# Patient Record
Sex: FEMALE | URBAN - METROPOLITAN AREA
[De-identification: names, ages, dates, MRNs, and addresses within clinical notes are randomized per-mention and may not be internally consistent; named-entity substitution may affect disease eponyms.]

---

## 2018-08-21 ENCOUNTER — APPOINTMENT (RX ONLY)
Dept: URBAN - METROPOLITAN AREA CLINIC 23 | Facility: CLINIC | Age: 63
Setting detail: DERMATOLOGY
End: 2018-08-21

## 2018-08-21 DIAGNOSIS — Z41.9 ENCOUNTER FOR PROCEDURE FOR PURPOSES OTHER THAN REMEDYING HEALTH STATE, UNSPECIFIED: ICD-10-CM

## 2018-08-21 PROCEDURE — ? DIAGNOSIS COMMENT

## 2018-08-21 PROCEDURE — ? FILLERS

## 2018-08-21 PROCEDURE — ? DYSPORT

## 2018-08-21 NOTE — PROCEDURE: DYSPORT
Levator Labii Superioris Units: 0
Additional Area 5 Location: Upper cutaneous lip.
Additional Area 6 Location: chin
Additional Area 1 Location: lateral eyes ( 20 units each side)
Price (Use Numbers Only, No Special Characters Or $): 0.00
Consent: Written consent obtained. Risks include but not limited to lid/brow ptosis, bruising, swelling, diplopia, temporary effect, incomplete chemical denervation.
Detail Level: Simple
Lot #: [id number]
Additional Area 3 Units: 10
Glabellar Complex Units: [address]
Expiration Date (Month Year): 02/28/2019
Additional Area 3 Location: [facility]
Dilution (U/ 0.1cc): 1.5

## 2018-08-21 NOTE — PROCEDURE: FILLERS
Tear Troughs Filler  Volume In Cc: 0
Additional Area 5 Location: NLFs,tear trough  and lips
Additional Area 1 Location: Perioral,glabella lines,
Map Statment: See [address] for Complete Details
Lot #: [id number]
Include Cannula Information In Note?: No
Additional Area 3 Location: lateral eyes, nasal root
Anesthesia Type: 1% lidocaine without epinephrine
Filler: Restylane Silk
Additional Area 1 Location: using the cannula to bilateral cheeks, NLF's, with conventional needle to lateral mouth, marionette lines
Lot #: [id number]
Additional Area 1 Location: perioral fine lines, lateral mouth, marionette lines
Additional Area 4 Location: cheeks/Marionette
Additional Area 2 Location: cheeks, lat jawline
Anesthesia Volume In Cc: 0.2
Expiration Date (Month Year): 02/28/2020
Detail Level: Zone
Price (Use Numbers Only, No Special Characters Or $): 0.00
Expiration Date (Month Year): 01/31/2021
Consent: Written consent obtained. Risks include but not limited to bruising, beading, irregular texture, ulceration, infection, allergic reaction, scar formation, incomplete augmentation, temporary nature, procedural pain.
Additional Area 2 Location: lat mouth, lips, nlf
Additional Area 1 Volume In Cc: 1
Post-Care Instructions: Patient instructed to apply ice to reduce swelling.
Filler: Denver Baum
Lot #: [id number]
Additional Anesthesia Volume In Cc: 6
Expiration Date (Month Year): 02/2020
Additional Area 2 Location: cheeks, lat jawlines
Filler: Restylane Refyne
Lot #: [id number]
Map Statment: See Attach Map for Complete Details
Lot #: [id number]
Lot #: [id number]
Additional Area 2 Location: Lat mouth, NLF, jawline

## 2018-08-27 ENCOUNTER — APPOINTMENT (RX ONLY)
Dept: URBAN - METROPOLITAN AREA CLINIC 23 | Facility: CLINIC | Age: 63
Setting detail: DERMATOLOGY
End: 2018-08-27

## 2018-08-27 DIAGNOSIS — Z41.9 ENCOUNTER FOR PROCEDURE FOR PURPOSES OTHER THAN REMEDYING HEALTH STATE, UNSPECIFIED: ICD-10-CM

## 2018-08-27 PROCEDURE — ? ADDITIONAL NOTES

## 2018-08-27 PROCEDURE — ? LIMELIGHT IPL

## 2018-08-27 ASSESSMENT — LOCATION DETAILED DESCRIPTION DERM
LOCATION DETAILED: RIGHT FOREHEAD
LOCATION DETAILED: RIGHT INFERIOR CENTRAL MALAR CHEEK
LOCATION DETAILED: LEFT CHIN
LOCATION DETAILED: LEFT FOREHEAD
LOCATION DETAILED: LEFT LATERAL MALAR CHEEK

## 2018-08-27 ASSESSMENT — LOCATION SIMPLE DESCRIPTION DERM
LOCATION SIMPLE: LEFT FOREHEAD
LOCATION SIMPLE: RIGHT FOREHEAD
LOCATION SIMPLE: CHIN
LOCATION SIMPLE: LEFT CHEEK
LOCATION SIMPLE: RIGHT CHEEK

## 2018-08-27 ASSESSMENT — LOCATION ZONE DERM: LOCATION ZONE: FACE

## 2018-08-27 NOTE — PROCEDURE: LIMELIGHT IPL
Consent: Written consent obtained, risks reviewed including but not limited to crusting, scabbing, blistering, scarring, darker or lighter pigmentary change, bruising, and/or incomplete response.
Spot Size(S): 10 x 30 mm
Coolin C
Treatment Number: 1
Post-Procedure: Following the procedure the post care instructions were reviewed with the patient.
Sun Mode: Off
Treated Area: small area
Fluence: [address] [facility]
Program: A
Detail Level: Zone
Program: HALIE
Fluence: 14
Post-Care Instructions: I reviewed with the patient in detail post-care instructions. Patient should stay away from the sun and wear sun protection until treated areas are fully healed.
Repetition Rate: 1 Hz
Program: B
Pre-Procedure: The treatment areas identified by the patient were cleansed, gel was applied and treatment was performed with the parameters mentioned above.
Sun Mode: On
Repetition Rate: 2 Hz
Fluence: 10
External Cooling Fan Speed: 5

## 2018-09-06 ENCOUNTER — APPOINTMENT (RX ONLY)
Dept: URBAN - METROPOLITAN AREA CLINIC 23 | Facility: CLINIC | Age: 63
Setting detail: DERMATOLOGY
End: 2018-09-06

## 2018-09-06 DIAGNOSIS — L72.0 EPIDERMAL CYST: ICD-10-CM

## 2018-09-06 DIAGNOSIS — Z71.89 OTHER SPECIFIED COUNSELING: ICD-10-CM

## 2018-09-06 DIAGNOSIS — Z41.9 ENCOUNTER FOR PROCEDURE FOR PURPOSES OTHER THAN REMEDYING HEALTH STATE, UNSPECIFIED: ICD-10-CM

## 2018-09-06 PROCEDURE — ? LIMELIGHT IPL

## 2018-09-06 PROCEDURE — ? ACNE SURGERY

## 2018-09-06 PROCEDURE — 10040 EXTRACTION: CPT | Mod: NC

## 2018-09-06 PROCEDURE — ? COUNSELING

## 2018-09-06 PROCEDURE — ? RECOMMENDATIONS

## 2018-09-06 ASSESSMENT — LOCATION DETAILED DESCRIPTION DERM
LOCATION DETAILED: RIGHT MEDIAL MALAR CHEEK
LOCATION DETAILED: LEFT CENTRAL BUCCAL CHEEK
LOCATION DETAILED: NASAL ROOT
LOCATION DETAILED: LEFT LATERAL MALAR CHEEK
LOCATION DETAILED: RIGHT LOWER CUTANEOUS LIP

## 2018-09-06 ASSESSMENT — LOCATION SIMPLE DESCRIPTION DERM
LOCATION SIMPLE: NOSE
LOCATION SIMPLE: RIGHT LIP
LOCATION SIMPLE: RIGHT CHEEK
LOCATION SIMPLE: LEFT CHEEK

## 2018-09-06 ASSESSMENT — LOCATION ZONE DERM
LOCATION ZONE: FACE
LOCATION ZONE: LIP
LOCATION ZONE: NOSE

## 2018-09-06 NOTE — PROCEDURE: LIMELIGHT IPL
Coolin C
Spot Size(S): 10 x 30 mm
Fluence: [address] [facility]
Number Of Passes: 1
Sun Mode: On
Repetition Rate: 1 Hz
Treated Area: small area
External Cooling Fan Speed: 5
Program: HALIE
Consent: Written consent obtained, risks reviewed including but not limited to crusting, scabbing, blistering, scarring, darker or lighter pigmentary change, bruising, and/or incomplete response.
Fluence: 10
Sun Mode: Off
Program: B
Detail Level: Zone
Program: A
Fluence: 14
Repetition Rate: 2 Hz
Post-Procedure: Following the procedure the post care instructions were reviewed with the patient.
Pre-Procedure: The treatment areas identified by the patient were cleansed, gel was applied and treatment was performed with the parameters mentioned above.
Post-Care Instructions: I reviewed with the patient in detail post-care instructions. Patient should stay away from the sun and wear sun protection until treated areas are fully healed.

## 2018-09-06 NOTE — PROCEDURE: ACNE SURGERY
Render Post-Care Instructions In Note?: no
Detail Level: Detailed
Hemostasis: Pressure
Post-Care Instructions: I reviewed with the patient in detail post-care instructions. Patient is to keep the treatment areas dry overnight, and then apply bacitracin twice daily until healed. Patient may apply hydrogen peroxide soaks to remove any crusting.
Extraction Method: lancet and extractor
Acne Type: Milial cysts
Consent was obtained and risks were reviewed including but not limited to scarring, infection, bleeding, scabbing, incomplete removal, and allergy to anesthesia.
Prep Text (Optional): Prior to removal the treatment areas were prepped in the usual fashion.

## 2019-03-05 ENCOUNTER — APPOINTMENT (RX ONLY)
Dept: URBAN - METROPOLITAN AREA CLINIC 23 | Facility: CLINIC | Age: 64
Setting detail: DERMATOLOGY
End: 2019-03-05

## 2019-03-05 DIAGNOSIS — Z41.9 ENCOUNTER FOR PROCEDURE FOR PURPOSES OTHER THAN REMEDYING HEALTH STATE, UNSPECIFIED: ICD-10-CM

## 2019-03-05 PROCEDURE — ? FILLERS

## 2019-03-05 PROCEDURE — ? PICOWAY LASER

## 2019-03-05 PROCEDURE — ? DYSPORT

## 2019-03-05 NOTE — PROCEDURE: DYSPORT
Levator Labii Superioris Units: 0
Additional Area 2 Location: chin
Additional Area 6 Location: high forehead 2 cm above brows
Additional Area 3 Location: lateral brows
Dilution (U/ 0.1cc): 1.5
Detail Level: Simple
Expiration Date (Month Year): 08/31/2019
Glabellar Complex Units: [address]
Lot #: [id number]
Price (Use Numbers Only, No Special Characters Or $): 0.00
Additional Area 5 Location: bunny lines
Additional Area 2 Units: 10
Consent: Written consent obtained. Risks include but not limited to lid/brow ptosis, bruising, swelling, diplopia, temporary effect, incomplete chemical denervation.
Additional Area 1 Location: lat brows

## 2019-03-05 NOTE — PROCEDURE: FILLERS
Vermilion Lips Filler Volume In Cc: 0
Anesthesia Type: 1% lidocaine without epinephrine
Additional Area 3 Location: Glabbellar fine lines, Nasalabial folds, Marionette lines, vermillion lip
Expiration Date (Month Year): 06/30/2021
Include Cannula Information In Note?: No
Consent: Written consent obtained. Risks include but not limited to bruising, beading, irregular texture, ulceration, infection, allergic reaction, scar formation, incomplete augmentation, temporary nature, procedural pain.
Additional Area 4 Location: Lateral Cheeks
Post-Care Instructions: Patient instructed to apply ice to reduce swelling.
Include Cannula Brand?: DermaSculpt
Additional Anesthesia Volume In Cc: 6
Lot #: [id number]
Include Cannula Size?: 25G
Additional Area 1 Location: lateral jawline, lateral cheeks, marionette lines, lateral mouth, perioral fine lines
Lot #: [id number]
Detail Level: Zone
Expiration Date (Month Year): 10/31/2021
Include Cannula Length?: 1.5 inch
Additional Area 2 Location: oral commissures.  Restylane diluted with 1% Lidocaine, injected vermillion lips fine lines,
Expiration Date (Month Year): 08/31/2019
Price (Use Numbers Only, No Special Characters Or $): 0.00
Additional Area 3 Location: Lateral Jawline, Lateral cheeks, oral commissures and Marionette
Filler: Denver Baum
Additional Area 4 Volume In Cc: 1
Additional Area 4 Location: [facility] [place]
Additional Area 1 Location: lateral mouth, marionette lines, perioral fine lines,
Additional Area 5 Location: Lips, NLFs, lateral Cheeks
Map Statment: See [address] for Complete Details
Additional Area 1 Location: lateral mouth, fine lines perioral, marionette lines, lateral cheeks, vermillion lips
Additional Area 2 Location: vermillion fine lines, nasalabial folds,oral commissure
Lot #: [id number]
Additional Area 2 Location: Nasalabial, Glabellar fine lines- Complimentary

## 2019-03-05 NOTE — PROCEDURE: PICOWAY LASER
Spot Size: 6.0 mm
Fluence (J/Cm2): 2
Wavelength: 532 nm
Treatment Number: 0
Fluence (J/Cm2): 1
Pre-Procedure: Prior to proceeding the treatment areas were cleaned and all present put on their eye protection.
Spot Size: 2.0 mm
Post-Procedure Care: Immediate endpoint: perifollicular erythema and edema. Vaseline and ice applied. Post care reviewed with patient.
Total Pulses: [address]
Pulse Duration: 2.5 ms
Detail Level: Detailed
Anesthesia Type: 1% lidocaine with epinephrine
Spot Size: 4.0 mm
Wavelength: 1064 nm
Post-Care Instructions: I reviewed with the patient in detail post-care instructions. Patient should avoid sun for a minimum of 4 weeks before and after treatment.
Consent: Written consent obtained, risks reviewed including but not limited to crusting, scabbing, blistering, scarring, darker or lighter pigmentary change, paradoxical hair regrowth, incomplete removal of hair and infection.
Fluence (J/Cm2): 1.7
Location Override: spots cheeks, forehead, chin, nose.
Total Pulses: 128 pulses

## 2019-08-26 ENCOUNTER — APPOINTMENT (RX ONLY)
Dept: URBAN - METROPOLITAN AREA CLINIC 23 | Facility: CLINIC | Age: 64
Setting detail: DERMATOLOGY
End: 2019-08-26

## 2019-08-26 DIAGNOSIS — Z41.9 ENCOUNTER FOR PROCEDURE FOR PURPOSES OTHER THAN REMEDYING HEALTH STATE, UNSPECIFIED: ICD-10-CM

## 2019-08-26 PROCEDURE — ? PICOWAY LASER

## 2019-08-26 PROCEDURE — ? DYSPORT

## 2019-08-26 NOTE — PROCEDURE: DYSPORT
Additional Area 1 Location: lat brow
Additional Area 3 Units: 0
Lot #: [id number]
Additional Area 2 Units: [address] [facility]
Additional Area 5 Location: glabella
Detail Level: Simple
Expiration Date (Month Year): 03/2020
Additional Area 1 Units: 10
Additional Area 4 Location: 2cm high forehead
Dilution (U/ 0.1cc): 1.5
Consent: Written consent obtained. Risks include but not limited to lid/brow ptosis, bruising, swelling, diplopia, temporary effect, incomplete chemical denervation.
Additional Area 3 Location: high forehead 3 cm above brows
Glabellar Complex Units: [address]
Additional Area 2 Location: crows feet
Price (Use Numbers Only, No Special Characters Or $): 0.00

## 2019-08-26 NOTE — PROCEDURE: PICOWAY LASER
Treatment Number: 2
Pulse Duration: 2.5 ms
External Cooling Fan Speed: 0
Anesthesia Type: 1% lidocaine with epinephrine
Consent: Written consent obtained, risks reviewed including but not limited to crusting, scabbing, blistering, scarring, darker or lighter pigmentary change, paradoxical hair regrowth, incomplete removal of hair and infection.
Wavelength: 532 nm
Fluence (J/Cm2): 0.6
Total Pulses: 149 pulses
Post-Care Instructions: I reviewed with the patient in detail post-care instructions. Patient should avoid sun for a minimum of 4 weeks before and after treatment.
Spot Size: 5.0 mm
Detail Level: Detailed
Spot Size: 4.0 mm
Pre-Procedure: Prior to proceeding the treatment areas were cleaned and all present put on their eye protection.
Total Pulses: [address] [facility]
Spot Size: 2.0 mm
Total Pulses: 2 passes 725 pulse
Spot Size: 6.5 mm
Hide Pulse Duration?: No
Location Override: Face and hands
Post-Procedure Care: Immediate endpoint: perifollicular erythema and edema. Vaseline and ice applied. Post care reviewed with patient.
Fluence (J/Cm2): 0.5
yes

## 2022-11-11 ENCOUNTER — APPOINTMENT (RX ONLY)
Dept: URBAN - METROPOLITAN AREA CLINIC 23 | Facility: CLINIC | Age: 67
Setting detail: DERMATOLOGY
End: 2022-11-11

## 2022-11-11 DIAGNOSIS — Z41.9 ENCOUNTER FOR PROCEDURE FOR PURPOSES OTHER THAN REMEDYING HEALTH STATE, UNSPECIFIED: ICD-10-CM

## 2022-11-11 PROCEDURE — ? DYSPORT

## 2022-11-11 PROCEDURE — ? CANDELA NORDLYS

## 2022-11-11 NOTE — PROCEDURE: DYSPORT
[facility] Units: 0
Expiration Date (Month Year): 07/31/20
Show Forehead Units: Yes
Show Right And Left Pupillary Line Units: No
Periorbital Skin Units: [address] [facility]
Additional Area 5 Location: bunny lines
Glabellar Complex Units: [address]
Additional Area 1 Location: chin
Additional Area 1 Units: 10
Additional Area 6 Location: left lateral brow
Additional Area 2 Location: nose tip
Forehead Units: 50
Lot #: [id number]
Consent: Written consent obtained. Risks include but not limited to lid/brow ptosis, bruising, swelling, diplopia, temporary effect, incomplete chemical denervation.
Additional Area 2 Units: 5
Detail Level: Detailed
Additional Area 3 Location: forehead
Post-Care Instructions: Patient instructed to not lie down for 4 hours, limit physical activity for 24 hours and avoid manipulation of the treated areas.
Dilution (U/ 0.1cc): 1.5
Show Inventory Tab: Show
Additional Area 4 Location: crows feet

## 2022-11-11 NOTE — PROCEDURE: CANDELA NORDLYS
Fluence In J/Cm2: 100
Pulse Duration In Ms (Will Not Render If Zero): 0
Post-Care Instructions: I reviewed with the patient in detail post-care instructions.
Overlap (Optional- Include Units If Applicable): Skin type 3, Medium tan
Hsv Prophylaxis: no
Eye Shielding Text (Leave Blank If Unwanted- Will Be Inserted If Selecting Eye Shields): The intraocular eye shields were placed. 2 drops of intraocular tetracaine HCL ophthalmic 0.5% solution was administered. The eye shields were coated with ophthalmic bacitracin prior to insertion. After the shields were removed the eyes were flushed with normal saline.
Modality: SWT
Hsv Prophylaxis Prescription: Valtrex 500mg BID starting the day of procedures and continuing until all sites healed
Location: decolletage of the chest
Applicator:  555 (555-950nm)
Overlap (Optional- Include Units If Applicable): Skin type 3, Medium tan (5.8-6.4)
Fluence In J/Cm2(Optional): 5.8
Detail Level: Zone
Pulse Time (Will Not Render If Zero): 10
Location: full face
Indication Override (Free Text): v chest and neck
Price (Use Numbers Only, No Special Characters Or $): Simba Hirsch
Fluence In J/Cm2(Optional): 7.4
Fluence In J/Cm2(Optional): 6.4
Consent: Written consent obtained, risks reviewed including but not limited to crusting, scabbing, blistering, scarring, darker or lighter pigmentary change, and/or incomplete removal.

## 2022-11-14 ENCOUNTER — APPOINTMENT (RX ONLY)
Dept: URBAN - METROPOLITAN AREA CLINIC 23 | Facility: CLINIC | Age: 67
Setting detail: DERMATOLOGY
End: 2022-11-14

## 2022-11-14 ENCOUNTER — RX ONLY (OUTPATIENT)
Age: 67
Setting detail: RX ONLY
End: 2022-11-14

## 2022-11-14 DIAGNOSIS — Z41.9 ENCOUNTER FOR PROCEDURE FOR PURPOSES OTHER THAN REMEDYING HEALTH STATE, UNSPECIFIED: ICD-10-CM

## 2022-11-14 PROCEDURE — ? PICOWAY LASER

## 2022-11-14 PROCEDURE — ? ADDITIONAL NOTES

## 2022-11-14 RX ORDER — TOBRAMYCIN AND DEXAMETHASONE 3; 1 MG/G; MG/G
OINTMENT OPHTHALMIC
Qty: 3.5 | Refills: 0 | Status: ERX | COMMUNITY
Start: 2022-11-14

## 2022-11-14 NOTE — PROCEDURE: PICOWAY LASER
Fluence (J/Cm2): 0.5
Pre-Procedure: Prior to proceeding the treatment areas were cleaned and all present put on their eye protection.
Treatment Number: 0
Location Override: face spots
Location Override: chest spots
Wavelength: 532 nm
Handpiece: Zoom
Spot Size: 4.0 mm
Post-Care Instructions: I reviewed with the patient in detail post-care instructions. Patient should avoid sun for a minimum of 4 weeks before and after treatment.
Spot Size: 6.0 mm x 6.0 mm
Frequency (Hz): 2 Hz
Pulse Duration: 2.5 ms
Post-Procedure Care: Immediate endpoint: mild erythema. Vaseline and ice applied. Post care reviewed with patient.
Detail Level: Detailed
Hide Pulse Duration?: Yes
Anesthesia Type: 1% lidocaine with epinephrine
Spot Size: 2.0 mm
Handpiece: Resolve
Fluence (J/Cm2): 2
Frequency (Hz): 5 Hz
Consent: Written consent obtained, risks reviewed including but not limited to crusting, scabbing, blistering, scarring, darker or lighter pigmentary change, paradoxical hair regrowth, incomplete removal of hair and infection.

## 2022-11-17 ENCOUNTER — APPOINTMENT (RX ONLY)
Dept: URBAN - METROPOLITAN AREA CLINIC 23 | Facility: CLINIC | Age: 67
Setting detail: DERMATOLOGY
End: 2022-11-17

## 2022-11-17 DIAGNOSIS — Z41.9 ENCOUNTER FOR PROCEDURE FOR PURPOSES OTHER THAN REMEDYING HEALTH STATE, UNSPECIFIED: ICD-10-CM

## 2022-11-17 PROCEDURE — ? DYSPORT

## 2022-11-17 NOTE — PROCEDURE: DYSPORT
Show Additional Area 3: Yes
Additional Area 2 Location: Crows feet
Additional Area 6 Units: 0
Show Ucl Units: No
Consent: Written consent obtained. Risks include but not limited to lid/brow ptosis, bruising, swelling, diplopia, temporary effect, incomplete chemical denervation.
Lot #: [id number]
Forehead Units: [address]
Post-Care Instructions: Patient instructed to not lie down for 4 hours, limit physical activity for 24 hours and avoid manipulation of the treated areas.
Additional Area 3 Location: forehead
Detail Level: Detailed
Dilution (U/ 0.1cc): 1.5
Show Inventory Tab: Show
Expiration Date (Month Year): 07/31/20
Additional Area 5 Location: bunny lines
Additional Area 1 Location: Glabella
Additional Area 6 Location: left lateral brow

## 2022-11-21 ENCOUNTER — APPOINTMENT (RX ONLY)
Dept: URBAN - METROPOLITAN AREA CLINIC 23 | Facility: CLINIC | Age: 67
Setting detail: DERMATOLOGY
End: 2022-11-21

## 2022-11-21 DIAGNOSIS — Z41.9 ENCOUNTER FOR PROCEDURE FOR PURPOSES OTHER THAN REMEDYING HEALTH STATE, UNSPECIFIED: ICD-10-CM

## 2022-11-21 PROCEDURE — ? DYSPORT

## 2022-11-21 PROCEDURE — ? DIAGNOSIS COMMENT

## 2023-02-20 ENCOUNTER — APPOINTMENT (RX ONLY)
Dept: URBAN - METROPOLITAN AREA CLINIC 23 | Facility: CLINIC | Age: 68
Setting detail: DERMATOLOGY
End: 2023-02-20

## 2023-02-20 DIAGNOSIS — Z41.9 ENCOUNTER FOR PROCEDURE FOR PURPOSES OTHER THAN REMEDYING HEALTH STATE, UNSPECIFIED: ICD-10-CM

## 2023-02-20 PROCEDURE — ? RECOMMENDATIONS

## 2023-02-20 NOTE — HPI: COSMETIC FOLLOW UP
What Condition Are We Treating?: S/p 3 months laser (IPL), followed by [facility] laser chest and face, c/o redness and burning post laser for several weeks. Now c/o area still healing post laser treatment, would like to discuss with dr Lucrecia Eddy.
What Procedure Did We Perform At The Last Visit?: Laser IPL followed by Kenji Rosado

## 2023-02-20 NOTE — PROCEDURE: RECOMMENDATIONS
Recommendations (Free Text): Patient was reassured had a allergic hypersensitivity reaction ( histamine release reaction) post [facility] laser treatment.
Detail Level: Detailed
Recommendation Preamble: The following recommendations were made during the visit:
Render Risk Assessment In Note?: no

## 2024-11-22 NOTE — PROCEDURE: DYSPORT
L Brow Units: 0
Additional Area 4 Location: crows feet
22-Nov-2024 04:29
Show Additional Area 1: Yes
Show Mentalis Units: No
Additional Area 5 Location: bunny lines
Expiration Date (Month Year): 07/31/20
Show Inventory Tab: Show
Additional Area 1 Location: bunny lines touch up
Additional Area 1 Units: 10
Additional Area 6 Location: left lateral brow
Lot #: [id number]
Consent: Written consent obtained. Risks include but not limited to lid/brow ptosis, bruising, swelling, diplopia, temporary effect, incomplete chemical denervation.
Additional Area 2 Location: left and right upper brow
Additional Area 3 Location: forehead
Post-Care Instructions: Patient instructed to not lie down for 4 hours, limit physical activity for 24 hours and avoid manipulation of the treated areas.
Detail Level: Detailed
Dilution (U/ 0.1cc): 1.5